# Patient Record
Sex: FEMALE | NOT HISPANIC OR LATINO | URBAN - METROPOLITAN AREA
[De-identification: names, ages, dates, MRNs, and addresses within clinical notes are randomized per-mention and may not be internally consistent; named-entity substitution may affect disease eponyms.]

---

## 2024-09-30 ENCOUNTER — OFFICE VISIT (OUTPATIENT)
Dept: FAMILY MEDICINE | Facility: CLINIC | Age: 24
End: 2024-09-30
Payer: COMMERCIAL

## 2024-09-30 VITALS
WEIGHT: 170 LBS | HEIGHT: 64 IN | BODY MASS INDEX: 29.02 KG/M2 | TEMPERATURE: 98 F | SYSTOLIC BLOOD PRESSURE: 121 MMHG | DIASTOLIC BLOOD PRESSURE: 75 MMHG | OXYGEN SATURATION: 98 % | HEART RATE: 76 BPM

## 2024-09-30 DIAGNOSIS — R19.5 LOOSE STOOLS: ICD-10-CM

## 2024-09-30 DIAGNOSIS — Z00.00 PREVENTATIVE HEALTH CARE: ICD-10-CM

## 2024-09-30 DIAGNOSIS — Z30.09 ENCOUNTER FOR OTHER GENERAL COUNSELING OR ADVICE ON CONTRACEPTION: ICD-10-CM

## 2024-09-30 DIAGNOSIS — E03.9 HYPOTHYROIDISM, UNSPECIFIED TYPE: Primary | ICD-10-CM

## 2024-09-30 LAB
BASO+EOS+MONOS # BLD AUTO: 0.3 10E3/UL (ref 0–2.2)
BASO+EOS+MONOS NFR BLD AUTO: 4 %
CHOLEST SERPL-MCNC: 142 MG/DL
ERYTHROCYTE [DISTWIDTH] IN BLOOD BY AUTOMATED COUNT: 12.7 % (ref 10–15)
EST. AVERAGE GLUCOSE BLD GHB EST-MCNC: 103 MG/DL
FASTING STATUS PATIENT QL REPORTED: NO
HBA1C MFR BLD: 5.2 % (ref 0–5.6)
HCT VFR BLD AUTO: 40.6 % (ref 35–47)
HCV AB SERPL QL IA: NONREACTIVE
HDLC SERPL-MCNC: 41 MG/DL
HGB BLD-MCNC: 12.9 G/DL (ref 11.7–15.7)
HIV 1+2 AB+HIV1 P24 AG SERPL QL IA: NONREACTIVE
LDLC SERPL CALC-MCNC: 72 MG/DL
LYMPHOCYTES # BLD AUTO: 2.3 10E3/UL (ref 0.8–5.3)
LYMPHOCYTES NFR BLD AUTO: 35 %
MCH RBC QN AUTO: 25.9 PG (ref 26.5–33)
MCHC RBC AUTO-ENTMCNC: 31.8 G/DL (ref 31.5–36.5)
MCV RBC AUTO: 82 FL (ref 78–100)
NEUTROPHILS # BLD AUTO: 4 10E3/UL (ref 1.6–8.3)
NEUTROPHILS NFR BLD AUTO: 61 %
NONHDLC SERPL-MCNC: 101 MG/DL
PLATELET # BLD AUTO: 358 10E3/UL (ref 150–450)
RBC # BLD AUTO: 4.98 10E6/UL (ref 3.8–5.2)
T3FREE SERPL-MCNC: 2.8 PG/ML (ref 2–4.4)
T4 FREE SERPL-MCNC: 1 NG/DL (ref 0.9–1.7)
TRIGL SERPL-MCNC: 146 MG/DL
TSH SERPL DL<=0.005 MIU/L-ACNC: 10.9 UIU/ML (ref 0.3–4.2)
WBC # BLD AUTO: 6.6 10E3/UL (ref 4–11)

## 2024-09-30 PROCEDURE — 84481 FREE ASSAY (FT-3): CPT | Performed by: FAMILY MEDICINE

## 2024-09-30 PROCEDURE — 84443 ASSAY THYROID STIM HORMONE: CPT | Performed by: FAMILY MEDICINE

## 2024-09-30 PROCEDURE — 80061 LIPID PANEL: CPT | Performed by: FAMILY MEDICINE

## 2024-09-30 PROCEDURE — 84439 ASSAY OF FREE THYROXINE: CPT | Performed by: FAMILY MEDICINE

## 2024-09-30 PROCEDURE — 86803 HEPATITIS C AB TEST: CPT | Performed by: FAMILY MEDICINE

## 2024-09-30 PROCEDURE — 87389 HIV-1 AG W/HIV-1&-2 AB AG IA: CPT | Performed by: FAMILY MEDICINE

## 2024-09-30 RX ORDER — LEVOTHYROXINE SODIUM 25 UG/1
25 TABLET ORAL DAILY
COMMUNITY

## 2024-09-30 SDOH — HEALTH STABILITY: PHYSICAL HEALTH: ON AVERAGE, HOW MANY DAYS PER WEEK DO YOU ENGAGE IN MODERATE TO STRENUOUS EXERCISE (LIKE A BRISK WALK)?: 4 DAYS

## 2024-09-30 SDOH — HEALTH STABILITY: PHYSICAL HEALTH: ON AVERAGE, HOW MANY MINUTES DO YOU ENGAGE IN EXERCISE AT THIS LEVEL?: 100 MIN

## 2024-09-30 ASSESSMENT — SOCIAL DETERMINANTS OF HEALTH (SDOH): HOW OFTEN DO YOU GET TOGETHER WITH FRIENDS OR RELATIVES?: ONCE A WEEK

## 2024-09-30 NOTE — NURSING NOTE
"Injectable Influenza Immunization Documentation    1.  Has the patient received the information for the injectable influenza vaccine? YES     2. Is the patient 6 months of age or older? YES     3. Does the patient have any of the following contraindications?         Severe allergy to eggs? No     Severe allergic reaction to previous influenza vaccines? No   Severe allergy to latex? No       History of Guillain-Maybeury syndrome? No     Currently have a temperature greater than 100.4F? No        4.  Severely egg allergic patients should have flu vaccine eligibility assessed by an MD, RN, or pharmacist, and those who received flu vaccine should be observed for 15 min by an MD, RN, Pharmacist, Medical Technician, or member of clinic staff.\": YES    5. Latex-allergic patients should be given latex-free influenza vaccine Yes. Please reference the Vaccine latex table to determine if your clinic s product is latex-containing.       Vaccination given by Yumi Kerns LPN on 9/30/2024 at 12:11 PM    Prior to immunization administration, verified patients identity using patient s name and date of birth. Please see Immunization Activity for additional information.     Screening Questionnaire for Adult Immunization    Are you sick today?   No   Do you have allergies to medications, food, a vaccine component or latex?   No   Have you ever had a serious reaction after receiving a vaccination?   No   Do you have a long-term health problem with heart, lung, kidney, or metabolic disease (e.g., diabetes), asthma, a blood disorder, no spleen, complement component deficiency, a cochlear implant, or a spinal fluid leak?  Are you on long-term aspirin therapy?   No   Do you have cancer, leukemia, HIV/AIDS, or any other immune system problem?   No   Do you have a parent, brother, or sister with an immune system problem?   No   In the past 3 months, have you taken medications that affect  your immune system, such as prednisone, other steroids, " or anticancer drugs; drugs for the treatment of rheumatoid arthritis, Crohn s disease, or psoriasis; or have you had radiation treatments?   No   Have you had a seizure, or a brain or other nervous system problem?   No   During the past year, have you received a transfusion of blood or blood    products, or been given immune (gamma) globulin or antiviral drug?   No   For women: Are you pregnant or is there a chance you could become       pregnant during the next month?   No   Have you received any vaccinations in the past 4 weeks?   No     Immunization questionnaire answers were all negative.      Patient instructed to remain in clinic for 15 minutes afterwards, and to report any adverse reactions.     Screening performed by Yumi Kerns LPN on 9/30/2024 at 12:11 PM.

## 2024-09-30 NOTE — PROGRESS NOTES
Assessment & Plan   James Villanueva is a 24 year old year old female with a history of hypothyroidism who presents to clinic today to establish care with symptoms of hypothyroidism after dose reduction in last 6 months.    1. Hypothyroidism, unspecified type  Labs today.  Suspect inadequately replaced given symptoms, will wait to refill until labs back.  Suspect will need to increase dose from 25 mcg  - TSH; Future  - T4 free; Future  - T3 Free; Future    2. Loose stools  Has had diarrhea work up at clinic in [place], will get records.    - TSH; Future  - CBC with platelets differential; Future    3. Encounter for other general counseling or advice on contraception  Reviewed options, provided with [email or web address] link.  Given side effects, if trying hormonal contraception again - progesterone only may be better option. Continue consistent condom use     4. Preventative health care  Counseled about pap smears and their importance. Follow up for pap smear.  - INFLUENZA VACCINE, SPLIT VIRUS, TRIVALENT,PF (FLUZONE\FLUARIX)  - Hemoglobin A1c; Future  - Lipid panel; Future  - HPV 9Y+ (GARDASIL 9)  - HIV Antigen Antibody Combo; Future  - Hepatitis C Screen Reflex to HCV RNA Quant and Genotype; Future        Subjective   James is a 24 year old, presenting for the following:  Physical (Pt has a thyroid problem and needs her levels checked.)         HPI    Hypothyroidism  - last checked Feb 2024, has been on medication for 3 years   - on levothyroxine, highest dose was 75 mcg - for two weeks.  Currently 25 mcg daily.  Patient reduced in spring as concerned that 50 mcg was contributing to loose stools   - losing hair  - tired  - takes medication first thing in morning, and then doesn't eat anything for 1 hour   - supplementation causes upset stomach, loose stool       Loose stool  - every time she eats, feels urge to stool  - started in April   - no blood in stool  - BM 2-3 times a day  - comes and goes - average week - maybe  2 or 3 days with loose stool   - lowered levothyroxine in April and it did help loose stool   - thought it might be dairy in diet - tried no dairy for 1 week, tried lactaid, didn't help  - tested for celiac, and inflammation levels, did stool test for inflammation in [place] - all normal    Contraception  - was on birth control, was gaining weight so stopped it  - using condoms consistently, same partner for 7 years   - cycles regular   - when on birth control, low libido, more tired, lower mood         9/30/2024   General Health   How would you rate your overall physical health? Good   Feel stress (tense, anxious, or unable to sleep) Only a little      (!) STRESS CONCERN      9/30/2024   Nutrition   Three or more servings of calcium each day? Yes   Diet: Regular (no restrictions)   How many servings of fruit and vegetables per day? (!) 2-3   How many sweetened beverages each day? 0-1            9/30/2024   Exercise   Days per week of moderate/strenous exercise 4 days   Average minutes spent exercising at this level 100 min            9/30/2024   Social Factors   Frequency of gathering with friends or relatives Once a week   Worry food won't last until get money to buy more No    No   Food not last or not have enough money for food? No    No   Do you have housing? (Housing is defined as stable permanent housing and does not include staying ouside in a car, in a tent, in an abandoned building, in an overnight shelter, or couch-surfing.) Yes    Yes   Are you worried about losing your housing? No    No   Lack of transportation? No    No   Unable to get utilities (heat,electricity)? No    No       Multiple values from one day are sorted in reverse-chronological order         9/30/2024   Dental   Dentist two times every year? Yes            9/30/2024   TB Screening   Were you born outside of the [place]? Yes            Today's PHQ-2 Score:       9/30/2024     8:35 AM   PHQ-2 ( 1999 [employer])   Q1: Little interest or pleasure in  "doing things 0   Q2: Feeling down, depressed or hopeless 0   PHQ-2 Score 0   Q1: Little interest or pleasure in doing things Not at all   Q2: Feeling down, depressed or hopeless Not at all   PHQ-2 Score 0           9/30/2024   Substance Use   Alcohol more than 3/day or more than 7/wk No   Do you use any other substances recreationally? No                   9/30/2024   One time HIV Screening   Previous HIV test? No          9/30/2024   STI Screening   New sexual partner(s) since last STI/HIV test? No        History of abnormal Pap smear: No - age 21-29 PAP every 3 years recommended - has never had a pap smear             9/30/2024   Contraception/Family Planning   Questions about contraception or family planning No           Reviewed and updated as needed this visit by Provider       Med Hx  Surg Hx  Fam Hx               Objective    Exam  /75   Pulse 76   Temp 98  F (36.7  C)   Ht 1.626 m (5' 4.02\")   Wt 77.1 kg (170 lb)   LMP 09/19/2024   SpO2 98%   BMI 29.17 kg/m     Estimated body mass index is 29.17 kg/m  as calculated from the following:    Height as of this encounter: 1.626 m (5' 4.02\").    Weight as of this encounter: 77.1 kg (170 lb).    Physical Exam  General: Alert, pleasant and cooperative in no acute distress.    Resp: Breathing comfortably, no respiratory distress, speaking in full sentences   Skin: no jaundice or rashes on visible skin   Psych: well groomed, mood appropriate to content of speech, linear thoughts, no pressured speech        Total time spent with patient 43 minutes, including review of cart, care of patient, coordination of care and communication of care plan.    Signed Electronically by: Cassia Maria MD    "

## 2024-09-30 NOTE — PATIENT INSTRUCTIONS
Flu vaccine today   HPV vaccine today      For your thyroid  - let's check where you are today  - I think there's a good chance we're increasing your synthroid       For the loose stool  - let's get the outside records from [place]  - and check for anemia       For contraception options  - condoms are good  - if you were going to try hormones again - I'd recommend progesterone only.  Depo shot - 3 months.  Nexplanon arm 5 years.  implant and Mirena IUD good for 7 years    [email or web address]      Come back for pap smear    Get COVID booster in the next month    For puzzles  - check out [employer] puzzles - wood and very cool shapes

## 2024-10-01 RX ORDER — LEVOTHYROXINE SODIUM 50 UG/1
50 TABLET ORAL DAILY
Qty: 90 TABLET | Refills: 1 | Status: SHIPPED | OUTPATIENT
Start: 2024-10-01

## 2024-10-06 ENCOUNTER — HEALTH MAINTENANCE LETTER (OUTPATIENT)
Age: 24
End: 2024-10-06